# Patient Record
Sex: MALE | Race: WHITE | NOT HISPANIC OR LATINO | Employment: FULL TIME | URBAN - METROPOLITAN AREA
[De-identification: names, ages, dates, MRNs, and addresses within clinical notes are randomized per-mention and may not be internally consistent; named-entity substitution may affect disease eponyms.]

---

## 2024-02-16 ENCOUNTER — APPOINTMENT (OUTPATIENT)
Dept: CARDIOLOGY | Facility: CLINIC | Age: 59
End: 2024-02-16

## 2024-03-08 ENCOUNTER — OFFICE VISIT (OUTPATIENT)
Dept: CARDIOLOGY | Facility: CLINIC | Age: 59
End: 2024-03-08
Payer: COMMERCIAL

## 2024-03-08 ENCOUNTER — LAB (OUTPATIENT)
Dept: LAB | Facility: LAB | Age: 59
End: 2024-03-08
Payer: COMMERCIAL

## 2024-03-08 VITALS
HEIGHT: 62 IN | SYSTOLIC BLOOD PRESSURE: 168 MMHG | OXYGEN SATURATION: 97 % | DIASTOLIC BLOOD PRESSURE: 98 MMHG | HEART RATE: 55 BPM | BODY MASS INDEX: 27.97 KG/M2 | WEIGHT: 152 LBS

## 2024-03-08 DIAGNOSIS — I25.10 CORONARY ARTERY DISEASE INVOLVING NATIVE CORONARY ARTERY OF NATIVE HEART WITHOUT ANGINA PECTORIS: Primary | ICD-10-CM

## 2024-03-08 DIAGNOSIS — I25.10 CORONARY ARTERY DISEASE INVOLVING NATIVE CORONARY ARTERY OF NATIVE HEART WITHOUT ANGINA PECTORIS: ICD-10-CM

## 2024-03-08 LAB
ALBUMIN SERPL BCP-MCNC: 4.4 G/DL (ref 3.4–5)
ALP SERPL-CCNC: 109 U/L (ref 33–120)
ALT SERPL W P-5'-P-CCNC: 16 U/L (ref 10–52)
ANION GAP SERPL CALC-SCNC: 9 MMOL/L (ref 10–20)
AST SERPL W P-5'-P-CCNC: 21 U/L (ref 9–39)
BILIRUB SERPL-MCNC: 0.7 MG/DL (ref 0–1.2)
BUN SERPL-MCNC: 11 MG/DL (ref 6–23)
CALCIUM SERPL-MCNC: 9.8 MG/DL (ref 8.6–10.6)
CHLORIDE SERPL-SCNC: 104 MMOL/L (ref 98–107)
CHOLEST SERPL-MCNC: 127 MG/DL (ref 0–199)
CHOLESTEROL/HDL RATIO: 3
CO2 SERPL-SCNC: 31 MMOL/L (ref 21–32)
CREAT SERPL-MCNC: 1 MG/DL (ref 0.5–1.3)
EGFRCR SERPLBLD CKD-EPI 2021: 87 ML/MIN/1.73M*2
ERYTHROCYTE [DISTWIDTH] IN BLOOD BY AUTOMATED COUNT: 12.8 % (ref 11.5–14.5)
EST. AVERAGE GLUCOSE BLD GHB EST-MCNC: 117 MG/DL
GLUCOSE SERPL-MCNC: 95 MG/DL (ref 74–99)
HBA1C MFR BLD: 5.7 %
HCT VFR BLD AUTO: 48.8 % (ref 41–52)
HDLC SERPL-MCNC: 42.3 MG/DL
HGB BLD-MCNC: 16.2 G/DL (ref 13.5–17.5)
LDLC SERPL CALC-MCNC: 69 MG/DL
MCH RBC QN AUTO: 30.2 PG (ref 26–34)
MCHC RBC AUTO-ENTMCNC: 33.2 G/DL (ref 32–36)
MCV RBC AUTO: 91 FL (ref 80–100)
NON HDL CHOLESTEROL: 85 MG/DL (ref 0–149)
NRBC BLD-RTO: 0 /100 WBCS (ref 0–0)
PLATELET # BLD AUTO: 221 X10*3/UL (ref 150–450)
POTASSIUM SERPL-SCNC: 5.3 MMOL/L (ref 3.5–5.3)
PROT SERPL-MCNC: 7.2 G/DL (ref 6.4–8.2)
RBC # BLD AUTO: 5.37 X10*6/UL (ref 4.5–5.9)
SODIUM SERPL-SCNC: 139 MMOL/L (ref 136–145)
TRIGL SERPL-MCNC: 80 MG/DL (ref 0–149)
TSH SERPL-ACNC: 1.93 MIU/L (ref 0.44–3.98)
VLDL: 16 MG/DL (ref 0–40)
WBC # BLD AUTO: 8 X10*3/UL (ref 4.4–11.3)

## 2024-03-08 PROCEDURE — 99214 OFFICE O/P EST MOD 30 MIN: CPT | Mod: 25 | Performed by: NURSE PRACTITIONER

## 2024-03-08 PROCEDURE — 80061 LIPID PANEL: CPT

## 2024-03-08 PROCEDURE — 85027 COMPLETE CBC AUTOMATED: CPT

## 2024-03-08 PROCEDURE — 99214 OFFICE O/P EST MOD 30 MIN: CPT | Performed by: NURSE PRACTITIONER

## 2024-03-08 PROCEDURE — 83036 HEMOGLOBIN GLYCOSYLATED A1C: CPT

## 2024-03-08 PROCEDURE — 93005 ELECTROCARDIOGRAM TRACING: CPT | Performed by: NURSE PRACTITIONER

## 2024-03-08 PROCEDURE — 80053 COMPREHEN METABOLIC PANEL: CPT

## 2024-03-08 PROCEDURE — 93010 ELECTROCARDIOGRAM REPORT: CPT | Performed by: INTERNAL MEDICINE

## 2024-03-08 PROCEDURE — 36415 COLL VENOUS BLD VENIPUNCTURE: CPT

## 2024-03-08 PROCEDURE — 84443 ASSAY THYROID STIM HORMONE: CPT

## 2024-03-08 PROCEDURE — 1036F TOBACCO NON-USER: CPT | Performed by: NURSE PRACTITIONER

## 2024-03-08 RX ORDER — METOPROLOL SUCCINATE 25 MG/1
25 TABLET, EXTENDED RELEASE ORAL DAILY
COMMUNITY

## 2024-03-08 RX ORDER — EZETIMIBE 10 MG/1
10 TABLET ORAL NIGHTLY
COMMUNITY

## 2024-03-08 RX ORDER — OMEPRAZOLE 20 MG/1
20 CAPSULE, DELAYED RELEASE ORAL DAILY
COMMUNITY

## 2024-03-08 RX ORDER — HYDROCHLOROTHIAZIDE 12.5 MG/1
1 TABLET ORAL DAILY
COMMUNITY
Start: 2021-07-23

## 2024-03-08 RX ORDER — IBUPROFEN 200 MG
CAPSULE ORAL EVERY 24 HOURS
COMMUNITY
Start: 2020-01-06

## 2024-03-08 RX ORDER — LOSARTAN POTASSIUM 25 MG/1
25 TABLET ORAL DAILY
COMMUNITY

## 2024-03-08 RX ORDER — ROSUVASTATIN CALCIUM 40 MG/1
40 TABLET, COATED ORAL DAILY
COMMUNITY

## 2024-03-08 ASSESSMENT — ENCOUNTER SYMPTOMS
GASTROINTESTINAL NEGATIVE: 1
RESPIRATORY NEGATIVE: 1
DEPRESSION: 0
LOSS OF SENSATION IN FEET: 0
OCCASIONAL FEELINGS OF UNSTEADINESS: 0
CONSTITUTIONAL NEGATIVE: 1
NEUROLOGICAL NEGATIVE: 1
MUSCULOSKELETAL NEGATIVE: 1

## 2024-03-08 ASSESSMENT — LIFESTYLE VARIABLES
HOW MANY STANDARD DRINKS CONTAINING ALCOHOL DO YOU HAVE ON A TYPICAL DAY: PATIENT DOES NOT DRINK
SKIP TO QUESTIONS 9-10: 1
HOW OFTEN DO YOU HAVE SIX OR MORE DRINKS ON ONE OCCASION: NEVER
AUDIT-C TOTAL SCORE: 0
HOW OFTEN DO YOU HAVE A DRINK CONTAINING ALCOHOL: NEVER

## 2024-03-09 LAB
ATRIAL RATE: 55 BPM
P AXIS: 31 DEGREES
P OFFSET: 193 MS
P ONSET: 134 MS
PR INTERVAL: 170 MS
Q ONSET: 219 MS
QRS COUNT: 9 BEATS
QRS DURATION: 90 MS
QT INTERVAL: 436 MS
QTC CALCULATION(BAZETT): 417 MS
QTC FREDERICIA: 423 MS
R AXIS: 39 DEGREES
T AXIS: 41 DEGREES
T OFFSET: 437 MS
VENTRICULAR RATE: 55 BPM

## 2024-03-21 ENCOUNTER — HOSPITAL ENCOUNTER (OUTPATIENT)
Facility: HOSPITAL | Age: 59
Setting detail: OUTPATIENT SURGERY
Discharge: HOME | End: 2024-03-21
Attending: INTERNAL MEDICINE | Admitting: INTERNAL MEDICINE
Payer: COMMERCIAL

## 2024-03-21 VITALS
TEMPERATURE: 98.2 F | OXYGEN SATURATION: 100 % | RESPIRATION RATE: 16 BRPM | SYSTOLIC BLOOD PRESSURE: 153 MMHG | DIASTOLIC BLOOD PRESSURE: 87 MMHG | HEART RATE: 72 BPM

## 2024-03-21 DIAGNOSIS — I20.9 ANGINA PECTORIS, UNSPECIFIED (CMS-HCC): ICD-10-CM

## 2024-03-21 DIAGNOSIS — I20.0 UNSTABLE ANGINA (MULTI): ICD-10-CM

## 2024-03-21 PROCEDURE — 7100000010 HC PHASE TWO TIME - EACH INCREMENTAL 1 MINUTE: Performed by: INTERNAL MEDICINE

## 2024-03-21 PROCEDURE — 93459 L HRT ART/GRFT ANGIO: CPT | Performed by: INTERNAL MEDICINE

## 2024-03-21 PROCEDURE — 99153 MOD SED SAME PHYS/QHP EA: CPT | Performed by: INTERNAL MEDICINE

## 2024-03-21 PROCEDURE — 99152 MOD SED SAME PHYS/QHP 5/>YRS: CPT | Performed by: INTERNAL MEDICINE

## 2024-03-21 PROCEDURE — 2500000004 HC RX 250 GENERAL PHARMACY W/ HCPCS (ALT 636 FOR OP/ED): Performed by: INTERNAL MEDICINE

## 2024-03-21 PROCEDURE — 2550000001 HC RX 255 CONTRASTS: Performed by: INTERNAL MEDICINE

## 2024-03-21 PROCEDURE — 2500000005 HC RX 250 GENERAL PHARMACY W/O HCPCS: Performed by: INTERNAL MEDICINE

## 2024-03-21 PROCEDURE — 7100000009 HC PHASE TWO TIME - INITIAL BASE CHARGE: Performed by: INTERNAL MEDICINE

## 2024-03-21 PROCEDURE — 2720000007 HC OR 272 NO HCPCS: Performed by: INTERNAL MEDICINE

## 2024-03-21 RX ORDER — EZETIMIBE 10 MG/1
10 TABLET ORAL NIGHTLY
Status: CANCELLED | OUTPATIENT
Start: 2024-03-21

## 2024-03-21 RX ORDER — NAPROXEN SODIUM 220 MG/1
81 TABLET, FILM COATED ORAL DAILY
Status: CANCELLED | OUTPATIENT
Start: 2024-03-21

## 2024-03-21 RX ORDER — METOPROLOL SUCCINATE 25 MG/1
25 TABLET, EXTENDED RELEASE ORAL DAILY
Status: CANCELLED | OUTPATIENT
Start: 2024-03-21

## 2024-03-21 RX ORDER — ROSUVASTATIN CALCIUM 20 MG/1
40 TABLET, COATED ORAL DAILY
Status: CANCELLED | OUTPATIENT
Start: 2024-03-21

## 2024-03-21 RX ORDER — LIDOCAINE HYDROCHLORIDE 10 MG/ML
INJECTION, SOLUTION EPIDURAL; INFILTRATION; INTRACAUDAL; PERINEURAL AS NEEDED
Status: DISCONTINUED | OUTPATIENT
Start: 2024-03-21 | End: 2024-03-21 | Stop reason: HOSPADM

## 2024-03-21 RX ORDER — HYDROCHLOROTHIAZIDE 12.5 MG/1
12.5 CAPSULE ORAL DAILY
Status: CANCELLED | OUTPATIENT
Start: 2024-03-21

## 2024-03-21 RX ORDER — MIDAZOLAM HYDROCHLORIDE 1 MG/ML
INJECTION, SOLUTION INTRAMUSCULAR; INTRAVENOUS AS NEEDED
Status: DISCONTINUED | OUTPATIENT
Start: 2024-03-21 | End: 2024-03-21 | Stop reason: HOSPADM

## 2024-03-21 RX ORDER — OMEPRAZOLE 20 MG/1
20 CAPSULE, DELAYED RELEASE ORAL DAILY
Status: CANCELLED | OUTPATIENT
Start: 2024-03-21

## 2024-03-21 RX ORDER — LOSARTAN POTASSIUM 25 MG/1
25 TABLET ORAL DAILY
Status: CANCELLED | OUTPATIENT
Start: 2024-03-21

## 2024-03-21 RX ORDER — FENTANYL CITRATE 50 UG/ML
INJECTION, SOLUTION INTRAMUSCULAR; INTRAVENOUS AS NEEDED
Status: DISCONTINUED | OUTPATIENT
Start: 2024-03-21 | End: 2024-03-21 | Stop reason: HOSPADM

## 2024-03-21 ASSESSMENT — COLUMBIA-SUICIDE SEVERITY RATING SCALE - C-SSRS
6. HAVE YOU EVER DONE ANYTHING, STARTED TO DO ANYTHING, OR PREPARED TO DO ANYTHING TO END YOUR LIFE?: NO
1. IN THE PAST MONTH, HAVE YOU WISHED YOU WERE DEAD OR WISHED YOU COULD GO TO SLEEP AND NOT WAKE UP?: NO
2. HAVE YOU ACTUALLY HAD ANY THOUGHTS OF KILLING YOURSELF?: NO

## 2024-03-21 ASSESSMENT — PAIN - FUNCTIONAL ASSESSMENT: PAIN_FUNCTIONAL_ASSESSMENT: 0-10

## 2024-03-21 NOTE — H&P
History Of Present Illness  Zhou Pinzon is a 59 y.o. male presenting with angina pectoris.  The patient's past medical history includes the followin. CAD with nontransmural inferior MI, 2004.    2. PCI stent deployment to 90% proximal LAD stenosis, 2004.      3. CABG x2 with LIMA to LAD, SVG to the obtuse marginal branch for high-grade LMCA stenosis, 2012, [facility] [place]. The patient is doing quite well since his operative procedure. He is fully active without limitation. The patient did have a nuclear exercise stress test performed on 2017 which was negative for any evidence of ischemia but with a moderate to severe scar of the distal inferior wall dating back to . There was septal dyskinesis related to prior CABG as well as a prominent right ventricle with a normal LV ejection fraction. The patient did have a repeat echocardiogram 2021. It demonstrates severe hypokinesis and thinning of the anteroseptal wall due to previous infarction with scar formation. There is a small area of dyskinesis involving the midportion of the interventricular septum. Nevertheless the overall LV ejection fraction remains in the upper 50% range. The patient presented 2021 with some atypical chest discomfort over the prior week. It was relatively localized to the midsternal region and is intermittent. He has had symptoms today lasting for 45 minutes. There is a component of central chest tenderness. EKG showed sinus rhythm with no change from previous tracing dating back to 2017. Suspected  symptoms were musculoskeletal in etiology and did not at that point pursue nuclear stress testing which otherwise will be planned for approximately 1 year from now. He will remain on his current aspirin 81 mg daily, losartan 100 mg daily, and hydrochlorothiazide 25 mg daily unchanged. For uncertain reasons his metoprolol tartrate 25 mg twice daily was discontinued by primary care. He did have a  sinus bradycardia at rest but will restart a lower dose of metoprolol succinate 25 mg daily given his underlying coronary artery disease.  Exercise nuclear stress test 02/2023  FINDINGS:  Both stress and rest studies demonstrate fixed defects along the  basal and distal aspects of the inferior wall with associated  hypokinesis and abnormal wall thickening.  IMPRESSION:  1. No definite evidence of ischemia.  2. Redemonstration of an infarct along the distal inferior wall.  There has been apparent interval development of a small infarct along  the basal inferior wall.  3. Normal left ventricular size.  4. Septal hypokinesis with an ejection fraction estimated at 53%,  previously 54%.  The patient was seen in the office on 3/8/2024 complaining of nearly 2-month recurrence of somewhat concerning chest discomfort and cardiac catheterization was recommended on the basis of the symptoms.    4. Low-grade carotid vascular disease. The patient did have a carotid ultrasound on 05/22/2012 which showed minimal   bilateral plaque less than 40% stenoses.    5. Hyperlipidemia. The patient did have lab work on 11/12/2019 with cholesterol 167  HDL 51 triglycerides 62. These values are reflective of rosuvastatin 40 mg daily. He was started on Zetia 10 mg daily. This had been recommended in the past but the patient evidently was unable to afford. Repeat lipid panel performed on 6/1/2020 included cholesterol 145 LDL 74 HDL 51 triglyceride 102. The CBC and SMA panels on that they were normal. His glycohemoglobin was 5.2%. Chest x-ray performed on 7/19/2020 also clear. The patient did have a more recent lipid panel on 2/27/2021 showing similar results with cholesterol 140 LDL 82 triglyceride 77 HDL 43.    6. Family history of CAD     7. Former history of tobacco use. The patient did have a chest x-ray on 7/19/2020 which was unremarkable.             Past Medical History  No past medical history on file.    Surgical History  Past  Surgical History:   Procedure Laterality Date    OTHER SURGICAL HISTORY  01/06/2020    Coronary artery bypass graft        Social History  He reports that he quit smoking about 20 years ago. His smoking use included cigarettes. He has never used smokeless tobacco. He reports that he does not drink alcohol and does not use drugs.    Family History  No family history on file.     Allergies  Lisinopril    Review of Systems     Physical Exam     Last Recorded Vitals  Blood pressure (!) 181/104, pulse 77, temperature 36.8 °C (98.2 °F), temperature source Oral, resp. rate 14, SpO2 98 %.    Relevant Results             Assessment/Plan   Active Problems:  There are no active Hospital Problems.  The patient is a 59-year-old white male with a longstanding history of coronary artery disease with a nontransmural anterior MI 5/13/2004, PCI and stent deployment to a 90% proximal LAD stenosis 5/14/2004, CABG x 2 with LIMA to LAD and SVG to obtuse marginal branch for high-grade LMCA stenosis 5/13/2012 with family history of heart disease former smoking hyperlipidemia.  He is admitted for cardiac catheterization due to recent increased exertional chest discomfort concerning for angina.           I spent 20 minutes in the professional and overall care of this patient.      Raza Luis MD

## 2024-03-21 NOTE — Clinical Note
Single view of the left ventricle obtained using power injection. Rate = 10 mL/sec. Total volume = 25 mL. 450 PSI

## 2024-03-21 NOTE — Clinical Note
Patient ID band present and verified. Patient contact is in waiting room. Contact(s) present: sister.

## 2024-03-21 NOTE — POST-PROCEDURE NOTE
Physician Transition of Care Summary  Invasive Cardiovascular Lab    Procedure Date: 3/21/2024  Attending:    * Raza Luis - Primary  Resident/Fellow/Other Assistant: Surgeon(s) and Role:    Indications:   Pre-op Diagnosis     * Unstable angina (CMS/HCC) [I20.0]    Post-procedure diagnosis:   Post-op Diagnosis     * Unstable angina (CMS/HCC) [I20.0]    Procedure(s):   Left Heart Cath  475[address] - [place] CATH PLMT L HRT & ARTS W/NJX & ANGIO IMG S&I        Procedure Findings:   Patent bypass grafts to the LAD and to the LCx and nonobstructive disease of the RCA.    Description of the Procedure:   Left heart catheterization plus coronary angiography plus saphenous vein graft angiography internal mammary artery graft angiography and left ventriculogram was performed via the right femoral artery.  There is a severe 80% distal LMCA stenosis similar to his preoperative study from 2012.  The LAD had a widely patent proximal stent and otherwise was relatively free of obstructive disease with competitive flow from the LIMA graft.  There was a widely patent LIMA graft to the mid LAD.  The nondominant LCx was large and relatively free of obstructive disease.  There was a widely patent saphenous vein graft to the proximal aspect of the first obtuse marginal branch of the LCx.  The large dominant RCA had diffuse mild luminal irregularities and a 50% late proximal smooth concentric narrowing unchanged from the study of 2012.  The left ventriculogram showed relatively preserved LV ejection fraction in the mid to upper 50% range    Complications:     None  Stents/Implants:   None    Anticoagulation/Antiplatelet Plan:   Aspirin 81 mg daily    Estimated Blood Loss:   15 mL    Anesthesia: Moderate Sedation Anesthesia Staff: No anesthesia staff entered.    Any Specimen(s) Removed:   No specimens collected during this procedure.    Disposition:     Home.  Patient will be seen for outpatient cardiology follow-up in 2 to 3  weeks.    Electronically signed by: Raza Luis MD, 3/21/2024 10:13 AM

## 2024-03-21 NOTE — DISCHARGE SUMMARY
Discharge Diagnosis  Coronary artery disease    Issues Requiring Follow-Up      Test Results Pending At Discharge  Pending Labs       No current pending labs.            Hospital Course   Elective cardiac catheterization was performed.  It demonstrated a severe 80% distal LMCA stenosis.  The LAD had a widely patent proximal stent and a patent LIMA graft to the mid vessel.  The nondominant LCx was relatively free of obstructive disease with a widely patent large saphenous vein graft to the proximal aspect of the first obtuse marginal branch.  The native dominant RCA had scattered calcification with a mild luminal irregularities and a 50% smooth concentric late proximal stenosis similar to the preoperative study of 2012.  Left ventriculogram showed intact LV systolic function.  The patient will be continued on medical therapy and will be seen in 2 to 3 weeks for office follow-up.    Pertinent Physical Exam At Time of Discharge  Physical Exam    Home Medications     Medication List      CONTINUE taking these medications     aspirin 81 mg capsule   calcium carbonate-vitamin D3 500 mg-3.125 mcg (125 unit) tablet tablet   ezetimibe 10 mg tablet; Commonly known as: Zetia   hydroCHLOROthiazide 12.5 mg tablet; Commonly known as: Microzide   losartan 25 mg tablet; Commonly known as: Cozaar   metoprolol succinate XL 25 mg 24 hr tablet; Commonly known as: Toprol-XL   omeprazole 20 mg DR capsule; Commonly known as: PriLOSEC   rosuvastatin 40 mg tablet; Commonly known as: Crestor       Outpatient Follow-Up  No future appointments.    Raza Luis MD

## 2024-04-10 PROBLEM — E78.00 PURE HYPERCHOLESTEROLEMIA: Chronic | Status: ACTIVE | Noted: 2018-05-07

## 2024-04-10 PROBLEM — K21.9 GERD WITHOUT ESOPHAGITIS: Status: ACTIVE | Noted: 2023-03-15

## 2024-04-10 PROBLEM — I25.10 ARTERIOSCLEROTIC CARDIOVASCULAR DISEASE: Status: ACTIVE | Noted: 2024-04-10

## 2024-04-10 PROBLEM — E78.5 HYPERLIPIDEMIA: Status: ACTIVE | Noted: 2024-04-10

## 2024-04-10 PROBLEM — I65.29 STENOSIS OF CAROTID ARTERY: Status: ACTIVE | Noted: 2023-03-15

## 2024-04-10 PROBLEM — R07.9 CHEST PAIN: Status: ACTIVE | Noted: 2024-04-10

## 2024-04-10 PROBLEM — K43.9 EPIGASTRIC HERNIA: Status: ACTIVE | Noted: 2023-03-15

## 2024-04-10 PROBLEM — K43.9 HERNIA OF ABDOMINAL WALL: Status: ACTIVE | Noted: 2023-01-31

## 2024-04-12 ENCOUNTER — OFFICE VISIT (OUTPATIENT)
Dept: CARDIOLOGY | Facility: CLINIC | Age: 59
End: 2024-04-12
Payer: COMMERCIAL

## 2024-04-12 VITALS
HEIGHT: 62 IN | WEIGHT: 150 LBS | OXYGEN SATURATION: 96 % | HEART RATE: 54 BPM | SYSTOLIC BLOOD PRESSURE: 130 MMHG | DIASTOLIC BLOOD PRESSURE: 72 MMHG | BODY MASS INDEX: 27.6 KG/M2

## 2024-04-12 DIAGNOSIS — I25.10 ARTERIOSCLEROTIC CARDIOVASCULAR DISEASE: Primary | ICD-10-CM

## 2024-04-12 PROCEDURE — 99214 OFFICE O/P EST MOD 30 MIN: CPT | Performed by: NURSE PRACTITIONER

## 2024-04-12 PROCEDURE — 3075F SYST BP GE 130 - 139MM HG: CPT | Performed by: NURSE PRACTITIONER

## 2024-04-12 PROCEDURE — 1036F TOBACCO NON-USER: CPT | Performed by: NURSE PRACTITIONER

## 2024-04-12 PROCEDURE — 3078F DIAST BP <80 MM HG: CPT | Performed by: NURSE PRACTITIONER

## 2024-04-12 ASSESSMENT — ENCOUNTER SYMPTOMS
RESPIRATORY NEGATIVE: 1
GASTROINTESTINAL NEGATIVE: 1
DEPRESSION: 0
CONSTITUTIONAL NEGATIVE: 1
NEUROLOGICAL NEGATIVE: 1
OCCASIONAL FEELINGS OF UNSTEADINESS: 0
CARDIOVASCULAR NEGATIVE: 1
MUSCULOSKELETAL NEGATIVE: 1
LOSS OF SENSATION IN FEET: 0

## 2024-04-12 NOTE — PROGRESS NOTES
"Chief Complaint:   Hospital Follow-up    History Of Present Illness:    .Mr Pinzon returns in follow up after cath.  Medical management was recommended.  Denies chest pain, sob, palpitations or pedal edema.           Last Recorded Vitals:  Blood pressure 130/72, pulse 54, height 1.575 m (5' 2\"), weight 68 kg (150 lb), SpO2 96%.     Past Medical History:  History reviewed. No pertinent past medical history.     Past Surgical History:  Past Surgical History:   Procedure Laterality Date    CARDIAC CATHETERIZATION N/A 3/21/2024    Procedure: Left Heart Cath;  Surgeon: Raza Luis MD;  Location: [facility] Cardiac Cath Lab;  Service: Cardiovascular;  Laterality: N/A;  [facility] [id number] NO PRECER REQUIRED PER [facility] [place] [phone]  CALL REFERANCE NUMBER IS [phone]    OTHER SURGICAL HISTORY  2020    Coronary artery bypass graft       Social History:  Social History     Socioeconomic History    Marital status: Single     Spouse name: None    Number of children: None    Years of education: None    Highest education level: None   Occupational History    None   Tobacco Use    Smoking status: Former     Current packs/day: 0.00     Types: Cigarettes     Quit date:      Years since quittin.2    Smokeless tobacco: Never   Substance and Sexual Activity    Alcohol use: Never    Drug use: Never    Sexual activity: None   Other Topics Concern    None   Social History Narrative    None     Social Determinants of Health     Financial Resource Strain: Not on file   Food Insecurity: Not on file   Transportation Needs: Not on file   Physical Activity: Not on file   Stress: Not on file   Social Connections: Not on file   Intimate Partner Violence: Not on file   Housing Stability: Not on file       Family History:  No family history on file.      Allergies:  Lisinopril    Outpatient Medications:  Current Outpatient Medications   Medication Sig Dispense Refill    aspirin 81 mg capsule Take by mouth.      calcium carbonate-vitamin D3 " 500 mg-3.125 mcg (125 unit) tablet tablet Take by mouth once every 24 hours.      ezetimibe (Zetia) 10 mg tablet Take 1 tablet (10 mg) by mouth once daily at bedtime.      hydroCHLOROthiazide (Microzide) 12.5 mg tablet Take 1 tablet (12.5 mg) by mouth once daily.      losartan (Cozaar) 25 mg tablet Take 1 tablet (25 mg) by mouth once daily.      metoprolol succinate XL (Toprol-XL) 25 mg 24 hr tablet Take 1 tablet (25 mg) by mouth once daily.      omeprazole (PriLOSEC) 20 mg DR capsule Take 1 capsule (20 mg) by mouth once daily.      rosuvastatin (Crestor) 40 mg tablet Take 1 tablet (40 mg) by mouth once daily.       No current facility-administered medications for this visit.        Physical Exam:  Cardiovascular:      PMI at left midclavicular line. Normal rate. Regular rhythm. Normal S1. Normal S2.       Murmurs: There is no murmur.      No gallop.  No click. No rub.   Pulses:     Intact distal pulses.   Edema:     Peripheral edema absent.         ROS:  Review of Systems   Constitutional: Negative.   Cardiovascular: Negative.    Respiratory: Negative.     Skin: Negative.    Musculoskeletal: Negative.    Gastrointestinal: Negative.    Genitourinary: Negative.    Neurological: Negative.           Last Labs:  CBC -  Lab Results   Component Value Date    WBC 8.0 03/08/2024    HGB 16.2 03/08/2024    HCT 48.8 03/08/2024    MCV 91 03/08/2024     03/08/2024       CMP -  Lab Results   Component Value Date    CALCIUM 9.8 03/08/2024    PROT 7.2 03/08/2024    ALBUMIN 4.4 03/08/2024    AST 21 03/08/2024    ALT 16 03/08/2024    ALKPHOS 109 03/08/2024    BILITOT 0.7 03/08/2024       LIPID PANEL -   Lab Results   Component Value Date    CHOL 127 03/08/2024    TRIG 80 03/08/2024    HDL 42.3 03/08/2024    CHHDL 3.0 03/08/2024    VLDL 16 03/08/2024    NHDL 85 03/08/2024       RENAL FUNCTION PANEL -   Lab Results   Component Value Date    GLUCOSE 95 03/08/2024     03/08/2024    K 5.3 03/08/2024     03/08/2024     CO2 31 03/08/2024    ANIONGAP 9 (L) 03/08/2024    BUN 11 03/08/2024    CREATININE 1.00 03/08/2024    CALCIUM 9.8 03/08/2024    ALBUMIN 4.4 03/08/2024        Lab Results   Component Value Date    HGBA1C 5.7 (H) 03/08/2024         Assessment/Plan   Problem List Items Addressed This Visit    None    1. CAD with nontransmural inferior MI, 05/13/2004.     2. PCI stent deployment to 90% proximal LAD stenosis, 05/14/2004.        3. CABG x2 with LIMA to LAD, SVG to the obtuse marginal branch for high-grade LMCA stenosis, 05/23/2012, [facility]. The patient is doing quite well since his operative procedure. He is fully active without limitation. The patient did have a nuclear exercise stress test performed on 12/13/2017 which was negative for any evidence of ischemia but with a moderate to severe scar of the distal inferior wall dating back to 2004. There was septal dyskinesis related to prior CABG as well as a prominent right ventricle with a normal LV ejection fraction. The patient did have a repeat echocardiogram 1/4/2021. It demonstrates severe hypokinesis and thinning of the anteroseptal wall due to previous infarction with scar formation. There is a small area of dyskinesis involving the midportion of the interventricular septum. Nevertheless the overall LV ejection fraction remains in the upper 50% range. The patient presented 9/8/2021 with some atypical chest discomfort over the prior week. It was relatively localized to the midsternal region and is intermittent. He has had symptoms today lasting for 45 minutes. There is a component of central chest tenderness. EKG showed sinus rhythm with no change from previous tracing dating back to 1/23/2017. Suspected  symptoms were musculoskeletal in etiology and did not at that point pursue nuclear stress testing which otherwise will be planned for approximately 1 year from now. He will remain on his current aspirin 81 mg daily, losartan 100 mg daily, and  hydrochlorothiazide 25 mg daily unchanged. For uncertain reasons his metoprolol tartrate 25 mg twice daily was discontinued by primary care. He did have a sinus bradycardia at rest but will restart a lower dose of metoprolol succinate 25 mg daily given his underlying coronary artery disease.  Exercise nuclear stress test 02/2023  FINDINGS:  Both stress and rest studies demonstrate fixed defects along the  basal and distal aspects of the inferior wall with associated  hypokinesis and abnormal wall thickening.  IMPRESSION:  1. No definite evidence of ischemia.  2. Redemonstration of an infarct along the distal inferior wall.  There has been apparent interval development of a small infarct along  the basal inferior wall.  3. Normal left ventricular size.  4. Septal hypokinesis with an ejection fraction estimated at 53%,  previously 54%.  The patient was seen in the office on 3/8/2024 complaining of nearly 2-month recurrence of somewhat concerning chest discomfort and cardiac catheterization was recommended on the basis of the symptoms.  Cath 03/21/2024  CONCLUSIONS:   1. Global left ventricular systolic function was normal.   2. Left ventricular ejection fraction was 55 to 60%.   3. Triple vessel disease.   4. Distal left main coronary artery showed 80% stenosis.   5. Widely patent proximal LAD stent.   6. The proximal third of the mid right coronary artery showed 50% stenosis.   7. The LIMA graft conduit originating in situ and attached to the mid left anterior descending widely patent.   8. Widely patent left internal mammary artery graft conduit originating in situ and attached to the mid left anterior descending.   9. The saphenous vein graft originating from the aorta and attached to the 1st obtuse marginal widely patent.      Recommendations:  Maximize medical therapy.  Agressive risk factor modification efforts.  Medical management of coronary artery disease.     4. Low-grade carotid vascular disease. The  patient did have a carotid ultrasound on 05/22/2012 which showed minimal   bilateral plaque less than 40% stenoses.     5. Hyperlipidemia. The patient did have lab work on 11/12/2019 with cholesterol 167  HDL 51 triglycerides 62. These values are reflective of rosuvastatin 40 mg daily. He was started on Zetia 10 mg daily. This had been recommended in the past but the patient evidently was unable to afford. Repeat lipid panel performed on 6/1/2020 included cholesterol 145 LDL 74 HDL 51 triglyceride 102. The CBC and SMA panels on that they were normal. His glycohemoglobin was 5.2%. Chest x-ray performed on 7/19/2020 also clear. The patient did have a more recent lipid panel on 2/27/2021 showing similar results with cholesterol 140 LDL 82 triglyceride 77 HDL 43.     6. Family history of CAD      7. Former history of tobacco use. The patient did have a chest x-ray on 7/19/2020 which was unremarkable.         Kristina Fall, APRN-CNP

## 2024-09-19 DIAGNOSIS — E78.00 PURE HYPERCHOLESTEROLEMIA: Primary | Chronic | ICD-10-CM

## 2024-09-19 RX ORDER — ROSUVASTATIN CALCIUM 40 MG/1
40 TABLET, COATED ORAL DAILY
Qty: 90 TABLET | Refills: 0 | Status: SHIPPED | OUTPATIENT
Start: 2024-09-19

## 2024-10-06 DIAGNOSIS — K21.9 GASTROESOPHAGEAL REFLUX DISEASE WITHOUT ESOPHAGITIS: ICD-10-CM

## 2024-10-06 DIAGNOSIS — E78.00 PURE HYPERCHOLESTEROLEMIA: Primary | Chronic | ICD-10-CM

## 2024-10-07 RX ORDER — OMEPRAZOLE 20 MG/1
20 CAPSULE, DELAYED RELEASE ORAL DAILY
Qty: 90 CAPSULE | Refills: 0 | Status: SHIPPED | OUTPATIENT
Start: 2024-10-07

## 2024-10-07 RX ORDER — EZETIMIBE 10 MG/1
10 TABLET ORAL NIGHTLY
Qty: 90 TABLET | Refills: 0 | Status: SHIPPED | OUTPATIENT
Start: 2024-10-07

## 2024-10-11 ENCOUNTER — APPOINTMENT (OUTPATIENT)
Dept: CARDIOLOGY | Facility: CLINIC | Age: 59
End: 2024-10-11
Payer: COMMERCIAL

## 2024-10-25 ENCOUNTER — APPOINTMENT (OUTPATIENT)
Dept: CARDIOLOGY | Facility: CLINIC | Age: 59
End: 2024-10-25
Payer: COMMERCIAL

## 2024-10-25 VITALS
HEART RATE: 54 BPM | HEIGHT: 62 IN | OXYGEN SATURATION: 96 % | BODY MASS INDEX: 27.23 KG/M2 | DIASTOLIC BLOOD PRESSURE: 80 MMHG | WEIGHT: 148 LBS | SYSTOLIC BLOOD PRESSURE: 124 MMHG

## 2024-10-25 DIAGNOSIS — I25.10 CORONARY ARTERY DISEASE INVOLVING NATIVE CORONARY ARTERY OF NATIVE HEART WITHOUT ANGINA PECTORIS: Primary | ICD-10-CM

## 2024-10-25 DIAGNOSIS — I10 ESSENTIAL HYPERTENSION: Chronic | ICD-10-CM

## 2024-10-25 DIAGNOSIS — E78.00 PURE HYPERCHOLESTEROLEMIA: Chronic | ICD-10-CM

## 2024-10-25 PROCEDURE — 3079F DIAST BP 80-89 MM HG: CPT | Performed by: NURSE PRACTITIONER

## 2024-10-25 PROCEDURE — 1036F TOBACCO NON-USER: CPT | Performed by: NURSE PRACTITIONER

## 2024-10-25 PROCEDURE — 99214 OFFICE O/P EST MOD 30 MIN: CPT | Performed by: NURSE PRACTITIONER

## 2024-10-25 PROCEDURE — 3074F SYST BP LT 130 MM HG: CPT | Performed by: NURSE PRACTITIONER

## 2024-10-25 PROCEDURE — 3008F BODY MASS INDEX DOCD: CPT | Performed by: NURSE PRACTITIONER

## 2024-10-25 ASSESSMENT — PAIN SCALES - GENERAL: PAINLEVEL_OUTOF10: 0-NO PAIN

## 2024-10-25 ASSESSMENT — ENCOUNTER SYMPTOMS
HYPERTENSION: 1
LOSS OF SENSATION IN FEET: 0
NEUROLOGICAL NEGATIVE: 1
DEPRESSION: 0
OCCASIONAL FEELINGS OF UNSTEADINESS: 0
CARDIOVASCULAR NEGATIVE: 1
GASTROINTESTINAL NEGATIVE: 1
MUSCULOSKELETAL NEGATIVE: 1
CONSTITUTIONAL NEGATIVE: 1
RESPIRATORY NEGATIVE: 1

## 2024-10-25 NOTE — PROGRESS NOTES
"Chief Complaint:   Hypertension, Hyperlipidemia, Coronary Artery Disease, and Follow-up (Pt denies c/o chest pain or SOB)    History Of Present Illness:    .Mr Pinzon returns in follow up after cath.   Denies chest pain, sob, palpitations or pedal edema.         Hypertension    Hyperlipidemia    Coronary Artery Disease  Risk factors include hyperlipidemia.        Last Recorded Vitals:  Blood pressure 124/80, pulse 54, height 1.575 m (5' 2\"), weight 67.1 kg (148 lb), SpO2 96%.     Past Medical History:  History reviewed. No pertinent past medical history.     Past Surgical History:  Past Surgical History:   Procedure Laterality Date   • CARDIAC CATHETERIZATION N/A 3/21/2024    Procedure: Left Heart Cath;  Surgeon: Raza Luis MD;  Location: [facility] Cardiac Cath Lab;  Service: Cardiovascular;  Laterality: N/A;  [facility] [address] NO PRECER REQUIRED PER [facility] [place] [phone]  CALL REFERANCE NUMBER IS [phone]   • OTHER SURGICAL HISTORY  2020    Coronary artery bypass graft       Social History:  Social History     Socioeconomic History   • Marital status: Single   Tobacco Use   • Smoking status: Former     Current packs/day: 0.00     Types: Cigarettes     Quit date:      Years since quittin.8   • Smokeless tobacco: Never   Substance and Sexual Activity   • Alcohol use: Never   • Drug use: Never       Family History:  No family history on file.      Allergies:  Lisinopril    Outpatient Medications:  Current Outpatient Medications   Medication Sig Dispense Refill   • aspirin 81 mg capsule Take by mouth.     • calcium carbonate-vitamin D3 500 mg-3.125 mcg (125 unit) tablet tablet Take by mouth once every 24 hours.     • ezetimibe (Zetia) 10 mg tablet take one tablet by mouth at bedtime 90 tablet 0   • hydroCHLOROthiazide (Microzide) 12.5 mg tablet Take 1 tablet (12.5 mg) by mouth once daily.     • losartan (Cozaar) 25 mg tablet Take 1 tablet (25 mg) by mouth once daily.     • metoprolol succinate XL (Toprol-XL) " 25 mg 24 hr tablet Take 1 tablet (25 mg) by mouth once daily.     • omeprazole (PriLOSEC) 20 mg DR capsule take one capsule by mouth every day 90 capsule 0   • rosuvastatin (Crestor) 40 mg tablet take one tablet by mouth daily 90 tablet 0     No current facility-administered medications for this visit.        Physical Exam:  Cardiovascular:      PMI at left midclavicular line. Normal rate. Regular rhythm. Normal S1. Normal S2.       Murmurs: There is no murmur.      No gallop.  No click. No rub.   Pulses:     Intact distal pulses.   Edema:     Peripheral edema absent.       ROS:  Review of Systems   Constitutional: Negative.   Cardiovascular: Negative.    Respiratory: Negative.     Skin: Negative.    Musculoskeletal: Negative.    Gastrointestinal: Negative.    Genitourinary: Negative.    Neurological: Negative.           Last Labs:  CBC -  Lab Results   Component Value Date    WBC 8.0 03/08/2024    HGB 16.2 03/08/2024    HCT 48.8 03/08/2024    MCV 91 03/08/2024     03/08/2024       CMP -  Lab Results   Component Value Date    CALCIUM 9.8 03/08/2024    PROT 7.2 03/08/2024    ALBUMIN 4.4 03/08/2024    AST 21 03/08/2024    ALT 16 03/08/2024    ALKPHOS 109 03/08/2024    BILITOT 0.7 03/08/2024       LIPID PANEL -   Lab Results   Component Value Date    CHOL 127 03/08/2024    TRIG 80 03/08/2024    HDL 42.3 03/08/2024    CHHDL 3.0 03/08/2024    VLDL 16 03/08/2024    NHDL 85 03/08/2024       RENAL FUNCTION PANEL -   Lab Results   Component Value Date    GLUCOSE 95 03/08/2024     03/08/2024    K 5.3 03/08/2024     03/08/2024    CO2 31 03/08/2024    ANIONGAP 9 (L) 03/08/2024    BUN 11 03/08/2024    CREATININE 1.00 03/08/2024    CALCIUM 9.8 03/08/2024    ALBUMIN 4.4 03/08/2024        Lab Results   Component Value Date    HGBA1C 5.7 (H) 03/08/2024         Assessment/Plan   Problem List Items Addressed This Visit    None    1. CAD with nontransmural inferior MI, 05/13/2004.     2. PCI stent deployment to 90%  proximal LAD stenosis, 05/14/2004.        3. CABG x2 with LIMA to LAD, SVG to the obtuse marginal branch for high-grade LMCA stenosis, 05/23/2012, [facility]. The patient is doing quite well since his operative procedure. He is fully active without limitation. The patient did have a nuclear exercise stress test performed on 12/13/2017 which was negative for any evidence of ischemia but with a moderate to severe scar of the distal inferior wall dating back to 2004. There was septal dyskinesis related to prior CABG as well as a prominent right ventricle with a normal LV ejection fraction. The patient did have a repeat echocardiogram 1/4/2021. It demonstrates severe hypokinesis and thinning of the anteroseptal wall due to previous infarction with scar formation. There is a small area of dyskinesis involving the midportion of the interventricular septum. Nevertheless the overall LV ejection fraction remains in the upper 50% range. The patient presented 9/8/2021 with some atypical chest discomfort over the prior week. It was relatively localized to the midsternal region and is intermittent. He has had symptoms today lasting for 45 minutes. There is a component of central chest tenderness. EKG showed sinus rhythm with no change from previous tracing dating back to 1/23/2017. Suspected  symptoms were musculoskeletal in etiology and did not at that point pursue nuclear stress testing which otherwise will be planned for approximately 1 year from now. He will remain on his current aspirin 81 mg daily, losartan 100 mg daily, and hydrochlorothiazide 25 mg daily unchanged. For uncertain reasons his metoprolol tartrate 25 mg twice daily was discontinued by primary care. He did have a sinus bradycardia at rest but will restart a lower dose of metoprolol succinate 25 mg daily given his underlying coronary artery disease.  Exercise nuclear stress test 02/2023  FINDINGS:  Both stress and rest studies demonstrate fixed  defects along the  basal and distal aspects of the inferior wall with associated  hypokinesis and abnormal wall thickening.  IMPRESSION:  1. No definite evidence of ischemia.  2. Redemonstration of an infarct along the distal inferior wall.  There has been apparent interval development of a small infarct along  the basal inferior wall.  3. Normal left ventricular size.  4. Septal hypokinesis with an ejection fraction estimated at 53%,  previously 54%.  The patient was seen in the office on 3/8/2024 complaining of nearly 2-month recurrence of somewhat concerning chest discomfort and cardiac catheterization was recommended on the basis of the symptoms.  Cath 03/21/2024  CONCLUSIONS:   1. Global left ventricular systolic function was normal.   2. Left ventricular ejection fraction was 55 to 60%.   3. Triple vessel disease.   4. Distal left main coronary artery showed 80% stenosis.   5. Widely patent proximal LAD stent.   6. The proximal third of the mid right coronary artery showed 50% stenosis.   7. The LIMA graft conduit originating in situ and attached to the mid left anterior descending widely patent.   8. Widely patent left internal mammary artery graft conduit originating in situ and attached to the mid left anterior descending.   9. The saphenous vein graft originating from the aorta and attached to the 1st obtuse marginal widely patent.      Recommendations:  Maximize medical therapy.  Agressive risk factor modification efforts.  Medical management of coronary artery disease.     4. Low-grade carotid vascular disease. The patient did have a carotid ultrasound on 05/22/2012 which showed minimal   bilateral plaque less than 40% stenoses.     5. Hyperlipidemia. The patient did have lab work on 11/12/2019 with cholesterol 167  HDL 51 triglycerides 62. These values are reflective of rosuvastatin 40 mg daily. He was started on Zetia 10 mg daily. This had been recommended in the past but the patient evidently  was unable to afford. Repeat lipid panel performed on 6/1/2020 included cholesterol 145 LDL 74 HDL 51 triglyceride 102. The CBC and SMA panels on that they were normal. His glycohemoglobin was 5.2%. Chest x-ray performed on 7/19/2020 also clear. The patient did have a more recent lipid panel on 2/27/2021 showing similar results with cholesterol 140 LDL 82 triglyceride 77 HDL 43.     6. Family history of CAD      7. Former history of tobacco use. The patient did have a chest x-ray on 7/19/2020 which was unremarkable.         Kristina Fall, APRN-CNP

## 2024-11-01 DIAGNOSIS — I25.10 ARTERIOSCLEROTIC CARDIOVASCULAR DISEASE: Primary | ICD-10-CM

## 2024-11-01 RX ORDER — METOPROLOL SUCCINATE 25 MG/1
25 TABLET, EXTENDED RELEASE ORAL DAILY
Qty: 90 TABLET | Refills: 0 | Status: SHIPPED | OUTPATIENT
Start: 2024-11-01

## 2024-11-26 ENCOUNTER — APPOINTMENT (OUTPATIENT)
Dept: PRIMARY CARE | Facility: CLINIC | Age: 59
End: 2024-11-26
Payer: COMMERCIAL

## 2024-11-26 VITALS
WEIGHT: 151 LBS | BODY MASS INDEX: 27.79 KG/M2 | SYSTOLIC BLOOD PRESSURE: 136 MMHG | HEIGHT: 62 IN | DIASTOLIC BLOOD PRESSURE: 74 MMHG

## 2024-11-26 DIAGNOSIS — Z12.11 ENCOUNTER FOR SCREENING COLONOSCOPY: ICD-10-CM

## 2024-11-26 DIAGNOSIS — Z23 ENCOUNTER FOR IMMUNIZATION: ICD-10-CM

## 2024-11-26 DIAGNOSIS — R42 DIZZINESS: ICD-10-CM

## 2024-11-26 DIAGNOSIS — Z12.5 ENCOUNTER FOR PROSTATE CANCER SCREENING: ICD-10-CM

## 2024-11-26 DIAGNOSIS — E78.2 MIXED HYPERLIPIDEMIA: ICD-10-CM

## 2024-11-26 DIAGNOSIS — I10 ESSENTIAL HYPERTENSION: Chronic | ICD-10-CM

## 2024-11-26 DIAGNOSIS — I25.10 CORONARY ARTERY DISEASE INVOLVING NATIVE CORONARY ARTERY OF NATIVE HEART WITHOUT ANGINA PECTORIS: Primary | ICD-10-CM

## 2024-11-26 PROCEDURE — 90656 IIV3 VACC NO PRSV 0.5 ML IM: CPT | Performed by: INTERNAL MEDICINE

## 2024-11-26 PROCEDURE — 99204 OFFICE O/P NEW MOD 45 MIN: CPT | Performed by: INTERNAL MEDICINE

## 2024-11-26 PROCEDURE — 3075F SYST BP GE 130 - 139MM HG: CPT | Performed by: INTERNAL MEDICINE

## 2024-11-26 PROCEDURE — 90471 IMMUNIZATION ADMIN: CPT | Performed by: INTERNAL MEDICINE

## 2024-11-26 PROCEDURE — 3078F DIAST BP <80 MM HG: CPT | Performed by: INTERNAL MEDICINE

## 2024-11-26 PROCEDURE — 3008F BODY MASS INDEX DOCD: CPT | Performed by: INTERNAL MEDICINE

## 2024-11-26 NOTE — PROGRESS NOTES
"Subjective   Patient ID: Zhou Pinzon is a 59 y.o. male who presents for New Patient Visit.    HPI   To establish PCP  Follow-up on hypertension high cholesterol coronary artery disease    Past medical history: CABG times 2014, stent, carotid vascular disease low-grade, high cholesterol  Social history: Quit smoking , no alcohol use no drugs  Occupation: Cut grass for [employer]  Family history: Mother  of cancer, father  of CAD and stroke  Review of Systems    Objective   /74   Ht 1.575 m (5' 2\")   Wt 68.5 kg (151 lb)   BMI 27.62 kg/m²     Physical Exam  Vitals reviewed.   Constitutional:       Appearance: Normal appearance.   HENT:      Head: Normocephalic and atraumatic.      Right Ear: Tympanic membrane, ear canal and external ear normal.      Left Ear: Tympanic membrane, ear canal and external ear normal.      Nose: Nose normal.      Mouth/Throat:      Pharynx: Oropharynx is clear.   Eyes:      Extraocular Movements: Extraocular movements intact.      Conjunctiva/sclera: Conjunctivae normal.      Pupils: Pupils are equal, round, and reactive to light.   Cardiovascular:      Rate and Rhythm: Normal rate and regular rhythm.      Pulses: Normal pulses.      Heart sounds: Normal heart sounds.   Pulmonary:      Effort: Pulmonary effort is normal.      Breath sounds: Normal breath sounds.   Abdominal:      General: Abdomen is flat. Bowel sounds are normal.      Palpations: Abdomen is soft.   Musculoskeletal:      Cervical back: Normal range of motion and neck supple.   Skin:     General: Skin is warm and dry.   Neurological:      General: No focal deficit present.      Mental Status: He is alert and oriented to person, place, and time.   Psychiatric:         Mood and Affect: Mood normal.         Assessment/Plan   Problem List Items Addressed This Visit             ICD-10-CM       Cardiac and Vasculature    Essential hypertension (Chronic) I10    Relevant Orders    CBC " (Completed)    Comprehensive Metabolic Panel (Completed)    Lipid Panel (Completed)    Thyroid Stimulating Hormone (Completed)    Hemoglobin A1C (Completed)    Hyperlipidemia E78.5    Relevant Orders    CBC (Completed)    Comprehensive Metabolic Panel (Completed)    Lipid Panel (Completed)    Thyroid Stimulating Hormone (Completed)    Hemoglobin A1C (Completed)     Other Visit Diagnoses         Codes    Coronary artery disease involving native coronary artery of native heart without angina pectoris    -  Primary I25.10    Encounter for immunization     Z23    Relevant Orders    Flu vaccine, trivalent, preservative free, age 6 months and greater (Fluraix/Fluzone/Flulaval) (Completed)    Encounter for prostate cancer screening     Z12.5    Relevant Orders    Prostate Specific Antigen, Screen (Completed)    Encounter for screening colonoscopy     Z12.11    Relevant Orders    Colonoscopy Screening; Average Risk Patient    Dizziness     R42    Relevant Orders    Vascular US Carotid Artery Duplex Bilateral (Completed)        Patient's old chart reviewed  Blood work ordered  Prostate test ordered  Patient has not had a follow-up ultrasound carotid will ordered  Screening colonoscopy ordered  Blood pressure is stable      no

## 2024-11-27 ENCOUNTER — LAB (OUTPATIENT)
Dept: LAB | Facility: LAB | Age: 59
End: 2024-11-27
Payer: COMMERCIAL

## 2024-11-27 ENCOUNTER — HOSPITAL ENCOUNTER (OUTPATIENT)
Dept: RADIOLOGY | Facility: CLINIC | Age: 59
Discharge: HOME | End: 2024-11-27
Payer: COMMERCIAL

## 2024-11-27 DIAGNOSIS — E78.2 MIXED HYPERLIPIDEMIA: ICD-10-CM

## 2024-11-27 DIAGNOSIS — R42 DIZZINESS: ICD-10-CM

## 2024-11-27 DIAGNOSIS — Z12.5 ENCOUNTER FOR PROSTATE CANCER SCREENING: ICD-10-CM

## 2024-11-27 DIAGNOSIS — I10 ESSENTIAL HYPERTENSION: Chronic | ICD-10-CM

## 2024-11-27 LAB
ALBUMIN SERPL BCP-MCNC: 4.4 G/DL (ref 3.4–5)
ALP SERPL-CCNC: 97 U/L (ref 33–120)
ALT SERPL W P-5'-P-CCNC: 19 U/L (ref 10–52)
ANION GAP SERPL CALC-SCNC: 13 MMOL/L (ref 10–20)
AST SERPL W P-5'-P-CCNC: 23 U/L (ref 9–39)
BILIRUB SERPL-MCNC: 0.8 MG/DL (ref 0–1.2)
BUN SERPL-MCNC: 11 MG/DL (ref 6–23)
CALCIUM SERPL-MCNC: 9.6 MG/DL (ref 8.6–10.6)
CHLORIDE SERPL-SCNC: 103 MMOL/L (ref 98–107)
CHOLEST SERPL-MCNC: 132 MG/DL (ref 0–199)
CHOLESTEROL/HDL RATIO: 2.9
CO2 SERPL-SCNC: 30 MMOL/L (ref 21–32)
CREAT SERPL-MCNC: 1.09 MG/DL (ref 0.5–1.3)
EGFRCR SERPLBLD CKD-EPI 2021: 79 ML/MIN/1.73M*2
ERYTHROCYTE [DISTWIDTH] IN BLOOD BY AUTOMATED COUNT: 13.1 % (ref 11.5–14.5)
GLUCOSE SERPL-MCNC: 82 MG/DL (ref 74–99)
HCT VFR BLD AUTO: 45.2 % (ref 41–52)
HDLC SERPL-MCNC: 46 MG/DL
HGB BLD-MCNC: 14.6 G/DL (ref 13.5–17.5)
LDLC SERPL CALC-MCNC: 67 MG/DL
MCH RBC QN AUTO: 29.4 PG (ref 26–34)
MCHC RBC AUTO-ENTMCNC: 32.3 G/DL (ref 32–36)
MCV RBC AUTO: 91 FL (ref 80–100)
NON HDL CHOLESTEROL: 86 MG/DL (ref 0–149)
NRBC BLD-RTO: 0 /100 WBCS (ref 0–0)
PLATELET # BLD AUTO: 197 X10*3/UL (ref 150–450)
POTASSIUM SERPL-SCNC: 4.7 MMOL/L (ref 3.5–5.3)
PROT SERPL-MCNC: 6.9 G/DL (ref 6.4–8.2)
PSA SERPL-MCNC: 0.2 NG/ML
RBC # BLD AUTO: 4.96 X10*6/UL (ref 4.5–5.9)
SODIUM SERPL-SCNC: 141 MMOL/L (ref 136–145)
TRIGL SERPL-MCNC: 94 MG/DL (ref 0–149)
VLDL: 19 MG/DL (ref 0–40)
WBC # BLD AUTO: 8.8 X10*3/UL (ref 4.4–11.3)

## 2024-11-27 PROCEDURE — 93880 EXTRACRANIAL BILAT STUDY: CPT

## 2024-11-27 PROCEDURE — 83036 HEMOGLOBIN GLYCOSYLATED A1C: CPT

## 2024-11-27 PROCEDURE — 84443 ASSAY THYROID STIM HORMONE: CPT

## 2024-11-27 PROCEDURE — 36415 COLL VENOUS BLD VENIPUNCTURE: CPT

## 2024-11-27 PROCEDURE — 93880 EXTRACRANIAL BILAT STUDY: CPT | Performed by: RADIOLOGY

## 2024-11-27 PROCEDURE — 80053 COMPREHEN METABOLIC PANEL: CPT

## 2024-11-27 PROCEDURE — G0103 PSA SCREENING: HCPCS

## 2024-11-27 PROCEDURE — 85027 COMPLETE CBC AUTOMATED: CPT

## 2024-11-27 PROCEDURE — 80061 LIPID PANEL: CPT

## 2024-11-28 LAB
EST. AVERAGE GLUCOSE BLD GHB EST-MCNC: 120 MG/DL
HBA1C MFR BLD: 5.8 %
TSH SERPL-ACNC: 1.66 MIU/L (ref 0.44–3.98)

## 2025-01-19 DIAGNOSIS — K21.9 GASTROESOPHAGEAL REFLUX DISEASE WITHOUT ESOPHAGITIS: ICD-10-CM

## 2025-01-19 DIAGNOSIS — E78.00 PURE HYPERCHOLESTEROLEMIA: Chronic | ICD-10-CM

## 2025-01-20 RX ORDER — ROSUVASTATIN CALCIUM 40 MG/1
40 TABLET, COATED ORAL DAILY
Qty: 90 TABLET | Refills: 0 | Status: SHIPPED | OUTPATIENT
Start: 2025-01-20

## 2025-01-20 RX ORDER — OMEPRAZOLE 20 MG/1
20 CAPSULE, DELAYED RELEASE ORAL DAILY
Qty: 90 CAPSULE | Refills: 0 | Status: SHIPPED | OUTPATIENT
Start: 2025-01-20

## 2025-01-20 RX ORDER — EZETIMIBE 10 MG/1
10 TABLET ORAL NIGHTLY
Qty: 90 TABLET | Refills: 0 | Status: SHIPPED | OUTPATIENT
Start: 2025-01-20

## 2025-03-01 DIAGNOSIS — I25.10 ARTERIOSCLEROTIC CARDIOVASCULAR DISEASE: ICD-10-CM

## 2025-03-03 RX ORDER — METOPROLOL SUCCINATE 25 MG/1
25 TABLET, EXTENDED RELEASE ORAL DAILY
Qty: 90 TABLET | Refills: 0 | Status: SHIPPED | OUTPATIENT
Start: 2025-03-03

## 2025-04-25 ENCOUNTER — APPOINTMENT (OUTPATIENT)
Dept: CARDIOLOGY | Facility: CLINIC | Age: 60
End: 2025-04-25
Payer: COMMERCIAL

## 2025-05-09 ENCOUNTER — APPOINTMENT (OUTPATIENT)
Dept: CARDIOLOGY | Facility: CLINIC | Age: 60
End: 2025-05-09
Payer: COMMERCIAL

## 2025-05-12 DIAGNOSIS — E78.00 PURE HYPERCHOLESTEROLEMIA: Chronic | ICD-10-CM

## 2025-05-12 DIAGNOSIS — K21.9 GASTROESOPHAGEAL REFLUX DISEASE WITHOUT ESOPHAGITIS: ICD-10-CM

## 2025-05-12 RX ORDER — ROSUVASTATIN CALCIUM 40 MG/1
40 TABLET, COATED ORAL DAILY
Qty: 90 TABLET | Refills: 0 | Status: SHIPPED | OUTPATIENT
Start: 2025-05-12

## 2025-05-12 RX ORDER — EZETIMIBE 10 MG/1
10 TABLET ORAL NIGHTLY
Qty: 90 TABLET | Refills: 0 | Status: SHIPPED | OUTPATIENT
Start: 2025-05-12

## 2025-05-12 RX ORDER — OMEPRAZOLE 20 MG/1
20 CAPSULE, DELAYED RELEASE ORAL DAILY
Qty: 90 CAPSULE | Refills: 0 | Status: SHIPPED | OUTPATIENT
Start: 2025-05-12

## 2025-05-16 ENCOUNTER — HOSPITAL ENCOUNTER (OUTPATIENT)
Dept: CARDIOLOGY | Facility: CLINIC | Age: 60
Discharge: HOME | End: 2025-05-16
Payer: COMMERCIAL

## 2025-05-16 ENCOUNTER — OFFICE VISIT (OUTPATIENT)
Dept: CARDIOLOGY | Facility: CLINIC | Age: 60
End: 2025-05-16
Payer: COMMERCIAL

## 2025-05-16 VITALS
BODY MASS INDEX: 27.25 KG/M2 | HEIGHT: 62 IN | HEART RATE: 54 BPM | WEIGHT: 148.1 LBS | SYSTOLIC BLOOD PRESSURE: 136 MMHG | OXYGEN SATURATION: 95 % | DIASTOLIC BLOOD PRESSURE: 84 MMHG

## 2025-05-16 DIAGNOSIS — I25.10 CORONARY ARTERY DISEASE INVOLVING NATIVE CORONARY ARTERY OF NATIVE HEART WITHOUT ANGINA PECTORIS: Primary | ICD-10-CM

## 2025-05-16 DIAGNOSIS — I25.10 CORONARY ARTERY DISEASE INVOLVING NATIVE CORONARY ARTERY OF NATIVE HEART WITHOUT ANGINA PECTORIS: ICD-10-CM

## 2025-05-16 PROCEDURE — 93306 TTE W/DOPPLER COMPLETE: CPT

## 2025-05-16 PROCEDURE — 3008F BODY MASS INDEX DOCD: CPT | Performed by: INTERNAL MEDICINE

## 2025-05-16 PROCEDURE — 93306 TTE W/DOPPLER COMPLETE: CPT | Performed by: INTERNAL MEDICINE

## 2025-05-16 PROCEDURE — 3074F SYST BP LT 130 MM HG: CPT | Performed by: INTERNAL MEDICINE

## 2025-05-16 PROCEDURE — 99214 OFFICE O/P EST MOD 30 MIN: CPT | Performed by: INTERNAL MEDICINE

## 2025-05-16 PROCEDURE — 3079F DIAST BP 80-89 MM HG: CPT | Performed by: INTERNAL MEDICINE

## 2025-05-16 PROCEDURE — 99214 OFFICE O/P EST MOD 30 MIN: CPT | Mod: 25 | Performed by: INTERNAL MEDICINE

## 2025-05-16 PROCEDURE — 93005 ELECTROCARDIOGRAM TRACING: CPT | Performed by: INTERNAL MEDICINE

## 2025-05-16 PROCEDURE — 93010 ELECTROCARDIOGRAM REPORT: CPT | Performed by: INTERNAL MEDICINE

## 2025-05-16 ASSESSMENT — ENCOUNTER SYMPTOMS
MUSCULOSKELETAL NEGATIVE: 1
NEUROLOGICAL NEGATIVE: 1
GASTROINTESTINAL NEGATIVE: 1
RESPIRATORY NEGATIVE: 1
CARDIOVASCULAR NEGATIVE: 1
CONSTITUTIONAL NEGATIVE: 1
HYPERTENSION: 1

## 2025-05-16 ASSESSMENT — PATIENT HEALTH QUESTIONNAIRE - PHQ9
1. LITTLE INTEREST OR PLEASURE IN DOING THINGS: NOT AT ALL
SUM OF ALL RESPONSES TO PHQ9 QUESTIONS 1 AND 2: 0
2. FEELING DOWN, DEPRESSED OR HOPELESS: NOT AT ALL

## 2025-05-16 ASSESSMENT — PAIN SCALES - GENERAL: PAINLEVEL_OUTOF10: 0-NO PAIN

## 2025-05-16 NOTE — PROGRESS NOTES
Chief Complaint:   No chief complaint on file.    History Of Present Illness:    .Mr Pinzon returns in follow up after cath.   Denies chest pain, sob, palpitations or pedal edema.         Hypertension    Hyperlipidemia    Coronary Artery Disease  Risk factors include hyperlipidemia.        Last Recorded Vitals:  There were no vitals taken for this visit.     Past Medical History:  History reviewed. No pertinent past medical history.     Past Surgical History:  Past Surgical History:   Procedure Laterality Date   • CARDIAC CATHETERIZATION N/A 3/21/2024    Procedure: Left Heart Cath;  Surgeon: Raza Luis MD;  Location: [facility] Cardiac Cath Lab;  Service: Cardiovascular;  Laterality: N/A;  [facility] [address] NO PRECER REQUIRED PER [facility] [phone]  CALL REFERANCE NUMBER IS [phone]   • OTHER SURGICAL HISTORY  2020    Coronary artery bypass graft       Social History:  Social History     Socioeconomic History   • Marital status: Single   Tobacco Use   • Smoking status: Former     Current packs/day: 0.00     Types: Cigarettes     Quit date:      Years since quittin.3   • Smokeless tobacco: Never   Substance and Sexual Activity   • Alcohol use: Never   • Drug use: Never       Family History:  No family history on file.      Allergies:  Lisinopril    Outpatient Medications:  Current Outpatient Medications   Medication Sig Dispense Refill   • aspirin 81 mg capsule Take by mouth.     • calcium carbonate-vitamin D3 500 mg-3.125 mcg (125 unit) tablet tablet Take by mouth once every 24 hours.     • ezetimibe (Zetia) 10 mg tablet take 1 tablet by mouth at bedtime 90 tablet 0   • metoprolol succinate XL (Toprol-XL) 25 mg 24 hr tablet take 1 tablet by mouth daily 90 tablet 0   • omeprazole (PriLOSEC) 20 mg DR capsule take 1 capsule by mouth every day 90 capsule 0   • rosuvastatin (Crestor) 40 mg tablet take 1 tablet by mouth daily 90 tablet 0     No current facility-administered medications for this visit.         Physical Exam:  Cardiovascular:      PMI at left midclavicular line. Normal rate. Regular rhythm. Normal S1. Normal S2.       Murmurs: There is no murmur.      No gallop.  No click. No rub.   Pulses:     Intact distal pulses.   Edema:     Peripheral edema absent.       ROS:  Review of Systems   Constitutional: Negative.   Cardiovascular: Negative.    Respiratory: Negative.     Skin: Negative.    Musculoskeletal: Negative.    Gastrointestinal: Negative.    Genitourinary: Negative.    Neurological: Negative.           Last Labs:  CBC -  Lab Results   Component Value Date    WBC 8.8 11/27/2024    HGB 14.6 11/27/2024    HCT 45.2 11/27/2024    MCV 91 11/27/2024     11/27/2024       CMP -  Lab Results   Component Value Date    CALCIUM 9.6 11/27/2024    PROT 6.9 11/27/2024    ALBUMIN 4.4 11/27/2024    AST 23 11/27/2024    ALT 19 11/27/2024    ALKPHOS 97 11/27/2024    BILITOT 0.8 11/27/2024       LIPID PANEL -   Lab Results   Component Value Date    CHOL 132 11/27/2024    TRIG 94 11/27/2024    HDL 46.0 11/27/2024    CHHDL 2.9 11/27/2024    VLDL 19 11/27/2024    NHDL 86 11/27/2024       RENAL FUNCTION PANEL -   Lab Results   Component Value Date    GLUCOSE 82 11/27/2024     11/27/2024    K 4.7 11/27/2024     11/27/2024    CO2 30 11/27/2024    ANIONGAP 13 11/27/2024    BUN 11 11/27/2024    CREATININE 1.09 11/27/2024    CALCIUM 9.6 11/27/2024    ALBUMIN 4.4 11/27/2024        Lab Results   Component Value Date    HGBA1C 5.8 (H) 11/27/2024         Assessment/Plan   Problem List Items Addressed This Visit    None    1. CAD with nontransmural inferior MI, 05/13/2004.     2. PCI stent deployment to 90% proximal LAD stenosis, 05/14/2004.        3. CABG x2 with LIMA to LAD, SVG to the obtuse marginal branch for high-grade LMCA stenosis, 05/23/2012, [facility]. The patient is doing quite well since his operative procedure. He is fully active without limitation. The patient did have a nuclear exercise  stress test performed on 12/13/2017 which was negative for any evidence of ischemia but with a moderate to severe scar of the distal inferior wall dating back to 2004. There was septal dyskinesis related to prior CABG as well as a prominent right ventricle with a normal LV ejection fraction. The patient did have a repeat echocardiogram 1/4/2021. It demonstrates severe hypokinesis and thinning of the anteroseptal wall due to previous infarction with scar formation. There is a small area of dyskinesis involving the midportion of the interventricular septum. Nevertheless the overall LV ejection fraction remains in the upper 50% range. The patient presented 9/8/2021 with some atypical chest discomfort over the prior week. It was relatively localized to the midsternal region and is intermittent. He has had symptoms today lasting for 45 minutes. There is a component of central chest tenderness. EKG showed sinus rhythm with no change from previous tracing dating back to 1/23/2017. Suspected  symptoms were musculoskeletal in etiology and did not at that point pursue nuclear stress testing which otherwise will be planned for approximately 1 year from now. He will remain on his current aspirin 81 mg daily, losartan 100 mg daily, and hydrochlorothiazide 25 mg daily unchanged. For uncertain reasons his metoprolol tartrate 25 mg twice daily was discontinued by primary care. He did have a sinus bradycardia at rest but will restart a lower dose of metoprolol succinate 25 mg daily given his underlying coronary artery disease.  Exercise nuclear stress test 02/2023  FINDINGS:  Both stress and rest studies demonstrate fixed defects along the  basal and distal aspects of the inferior wall with associated  hypokinesis and abnormal wall thickening.  IMPRESSION:  1. No definite evidence of ischemia.  2. Redemonstration of an infarct along the distal inferior wall.  There has been apparent interval development of a small infarct along  the  basal inferior wall.  3. Normal left ventricular size.  4. Septal hypokinesis with an ejection fraction estimated at 53%,  previously 54%.  The patient was seen in the office on 3/8/2024 complaining of nearly 2-month recurrence of somewhat concerning chest discomfort and cardiac catheterization was recommended on the basis of the symptoms.  Cath 03/21/2024  CONCLUSIONS:   1. Global left ventricular systolic function was normal.   2. Left ventricular ejection fraction was 55 to 60%.   3. Triple vessel disease.   4. Distal left main coronary artery showed 80% stenosis.   5. Widely patent proximal LAD stent.   6. The proximal third of the mid right coronary artery showed 50% stenosis.   7. The LIMA graft conduit originating in situ and attached to the mid left anterior descending widely patent.   8. Widely patent left internal mammary artery graft conduit originating in situ and attached to the mid left anterior descending.   9. The saphenous vein graft originating from the aorta and attached to the 1st obtuse marginal widely patent.      Recommendations:  Maximize medical therapy.  Agressive risk factor modification efforts.  Medical management of coronary artery disease.     4. Low-grade carotid vascular disease. The patient did have a carotid ultrasound on 05/22/2012 which showed minimal   bilateral plaque less than 40% stenoses.     5. Hyperlipidemia. The patient did have lab work on 11/12/2019 with cholesterol 167  HDL 51 triglycerides 62. These values are reflective of rosuvastatin 40 mg daily. He was started on Zetia 10 mg daily. This had been recommended in the past but the patient evidently was unable to afford. Repeat lipid panel performed on 6/1/2020 included cholesterol 145 LDL 74 HDL 51 triglyceride 102. The CBC and SMA panels on that they were normal. His glycohemoglobin was 5.2%. Chest x-ray performed on 7/19/2020 also clear. The patient did have a more recent lipid panel on 2/27/2021 showing  similar results with cholesterol 140 LDL 82 triglyceride 77 HDL 43.     6. Family history of CAD      7. Former history of tobacco use. The patient did have a chest x-ray on 7/19/2020 which was unremarkable.

## 2025-05-16 NOTE — PATIENT INSTRUCTIONS
Echo done today  EKG done in clinic today   Continue medications  Labs done last year  Follow up in 6 months

## 2025-05-18 LAB
AORTIC VALVE PEAK VELOCITY: 1.42 M/S
AV PEAK GRADIENT: 8 MMHG
AVA (PEAK VEL): 2.11 CM2
EJECTION FRACTION APICAL 4 CHAMBER: 53.1
EJECTION FRACTION: 53 %
LEFT ATRIUM VOLUME AREA LENGTH INDEX BSA: 32.5 ML/M2
LEFT VENTRICLE INTERNAL DIMENSION DIASTOLE: 4.62 CM (ref 3.5–6)
LEFT VENTRICULAR OUTFLOW TRACT DIAMETER: 1.9 CM
MITRAL VALVE E/A RATIO: 0.74
RIGHT VENTRICLE FREE WALL PEAK S': 9.33 CM/S
RIGHT VENTRICLE PEAK SYSTOLIC PRESSURE: 21.3 MMHG

## 2025-05-23 LAB
ATRIAL RATE: 52 BPM
P AXIS: 24 DEGREES
P OFFSET: 195 MS
P ONSET: 139 MS
PR INTERVAL: 166 MS
Q ONSET: 222 MS
QRS COUNT: 8 BEATS
QRS DURATION: 86 MS
QT INTERVAL: 438 MS
QTC CALCULATION(BAZETT): 407 MS
QTC FREDERICIA: 417 MS
R AXIS: 36 DEGREES
T AXIS: 50 DEGREES
T OFFSET: 441 MS
VENTRICULAR RATE: 52 BPM

## 2025-07-20 DIAGNOSIS — I25.10 ARTERIOSCLEROTIC CARDIOVASCULAR DISEASE: ICD-10-CM

## 2025-07-21 RX ORDER — METOPROLOL SUCCINATE 25 MG/1
25 TABLET, EXTENDED RELEASE ORAL DAILY
Qty: 90 TABLET | Refills: 0 | Status: SHIPPED | OUTPATIENT
Start: 2025-07-21

## 2025-08-24 DIAGNOSIS — E78.00 PURE HYPERCHOLESTEROLEMIA: Chronic | ICD-10-CM

## 2025-08-24 DIAGNOSIS — K21.9 GASTROESOPHAGEAL REFLUX DISEASE WITHOUT ESOPHAGITIS: ICD-10-CM

## 2025-08-25 RX ORDER — OMEPRAZOLE 20 MG/1
20 CAPSULE, DELAYED RELEASE ORAL DAILY
Qty: 90 CAPSULE | Refills: 0 | Status: SHIPPED | OUTPATIENT
Start: 2025-08-25 | End: 2025-08-26

## 2025-08-25 RX ORDER — ROSUVASTATIN CALCIUM 40 MG/1
40 TABLET, COATED ORAL DAILY
Qty: 90 TABLET | Refills: 0 | Status: SHIPPED | OUTPATIENT
Start: 2025-08-25

## 2025-08-25 RX ORDER — EZETIMIBE 10 MG/1
10 TABLET ORAL NIGHTLY
Qty: 90 TABLET | Refills: 0 | Status: SHIPPED | OUTPATIENT
Start: 2025-08-25

## 2025-08-26 DIAGNOSIS — K21.9 GASTROESOPHAGEAL REFLUX DISEASE WITHOUT ESOPHAGITIS: ICD-10-CM

## 2025-08-26 RX ORDER — OMEPRAZOLE 20 MG/1
20 CAPSULE, DELAYED RELEASE ORAL DAILY
Qty: 90 CAPSULE | Refills: 0 | Status: SHIPPED | OUTPATIENT
Start: 2025-08-26

## (undated) DEVICE — KIT, NAMIC STANDARD LEFT HEART, CUSTOM, LWMC

## (undated) DEVICE — CATHETER, DIAG, EXPO, 5FR 110CM, PIG 145

## (undated) DEVICE — Device

## (undated) DEVICE — GUIDEWIRE, J TIP, 3 MM, 0.035 IN X 150 CM, PTFE

## (undated) DEVICE — PACK, ANGIO P2, CUSTOM, LAKE

## (undated) DEVICE — INTRODUCER, SHEATH, AVANTI PLUS, W/MINI GUIDEWIRE, STANDARD LENGTH, 5 FR, 11 CM

## (undated) DEVICE — CATHETER, DIAG, EXPO, 5FR IM

## (undated) DEVICE — CATHETER, DIAG, EXPO, 5FR FR4

## (undated) DEVICE — CATHETER, ANGIO, EXPO, WRP, 5 FR X 100 CM